# Patient Record
Sex: FEMALE | NOT HISPANIC OR LATINO | URBAN - METROPOLITAN AREA
[De-identification: names, ages, dates, MRNs, and addresses within clinical notes are randomized per-mention and may not be internally consistent; named-entity substitution may affect disease eponyms.]

---

## 2020-01-24 ENCOUNTER — HOSPITAL ENCOUNTER (OUTPATIENT)
Dept: OTHER | Facility: HOSPITAL | Age: 25
Discharge: HOME | End: 2020-01-24
Attending: NURSE PRACTITIONER

## 2020-01-24 ENCOUNTER — OFFICE VISIT CONVERTED (OUTPATIENT)
Dept: FAMILY MEDICINE CLINIC | Age: 25
End: 2020-01-24
Attending: NURSE PRACTITIONER

## 2020-01-24 LAB
AMYLASE SERPL-CCNC: 64 U/L (ref 30–110)
ERYTHROCYTE [DISTWIDTH] IN BLOOD BY AUTOMATED COUNT: 12.1 % (ref 11.5–14.5)
HBA1C MFR BLD: 13.6 G/DL (ref 12–16)
HCT VFR BLD AUTO: 41.2 % (ref 37–47)
LIPASE SERPL-CCNC: 17 U/L (ref 5–51)
MCH RBC QN AUTO: 29.6 PG (ref 27–31)
MCHC RBC AUTO-ENTMCNC: 33 G/DL (ref 33–37)
MCV RBC AUTO: 89.6 FL (ref 81–99)
PLATELET # BLD AUTO: 274 10*3/UL (ref 130–400)
PMV BLD AUTO: 9.3 FL (ref 7.4–10.4)
RBC # BLD AUTO: 4.6 10*6/UL (ref 4.2–5.4)
WBC # BLD AUTO: 11.37 10*3/UL (ref 4.8–10.8)

## 2020-01-28 ENCOUNTER — HOSPITAL ENCOUNTER (OUTPATIENT)
Dept: OTHER | Facility: HOSPITAL | Age: 25
Discharge: HOME | End: 2020-01-28
Attending: NURSE PRACTITIONER

## 2020-02-04 ENCOUNTER — HOSPITAL ENCOUNTER (OUTPATIENT)
Dept: NUCLEAR MEDICINE | Facility: HOSPITAL | Age: 25
Discharge: HOME | End: 2020-02-04
Attending: NURSE PRACTITIONER

## 2020-07-14 ENCOUNTER — OFFICE VISIT CONVERTED (OUTPATIENT)
Dept: FAMILY MEDICINE CLINIC | Age: 25
End: 2020-07-14
Attending: NURSE PRACTITIONER

## 2020-07-14 ENCOUNTER — HOSPITAL ENCOUNTER (OUTPATIENT)
Dept: OTHER | Facility: HOSPITAL | Age: 25
Discharge: HOME | End: 2020-07-14
Attending: NURSE PRACTITIONER

## 2021-05-18 NOTE — PROGRESS NOTES
Margaux Hammonds  1995     Office/Outpatient Visit    Visit Date: Tue, Jul 14, 2020 08:53 am    Provider: Flavia Gamble N.P. (Assistant: Bibi Montes MA)    Location: [facility]        Electronically signed by Flavia Gamble N.P. on  07/14/2020 09:51:55 AM                             Subjective:        CC: Mrs. Hammonds is a 24 year old White female.  She presents with low back pain, leg weakness, falls. Injury Friday night.          HPI: 24 year old female presenting to clinic with back starting 4 days ago. Pt was playing [employer] with  and went to [employer] to catch.  Pt has been using icy hot patches for discomfort. She states she fell 4 times (states that right leg would give out). No issues with bowel or bladder.     ROS:     CONSTITUTIONAL:  Negative for chills, fatigue and fever.      CARDIOVASCULAR:  Negative for chest pain and palpitations.      RESPIRATORY:  Negative for recent cough and dyspnea.      GASTROINTESTINAL:  Negative for abdominal pain.      MUSCULOSKELETAL:  Negative for myalgias.      NEUROLOGICAL:  Positive for paresthesia ( right lower extremity ).   Negative for dizziness or weakness.          Past Medical History / Family History / Social History:         Last Reviewed on 7/14/2020 09:21 AM by Flavia Gamble    Past Medical History:                 PAST MEDICAL HISTORY     UNREMARKABLE         CURRENT MEDICAL PROVIDERS:    Obstetrician/Gynecologist: sarthak machado         Surgical History:         Positive for    age 6 upper and lower scopes results unknown;;         Family History:         Positive for Hypertension.      Positive for Breast Cancer ( mat. aunt ), Colon Cancer ( mat. GF ), Lung Cancer ( mat. GF -- former smoker ) and Ovarian Cancer ( mat. aunt ).      Positive for Type 2 Diabetes ( pat. GF ).      Positive for Bipolar Disorder ( mother; pat. GM ).          Social History:         Marital Status:      Children: 2 children          Tobacco/Alcohol/Supplements:     Last Reviewed on 7/14/2020 09:21 AM by Flavia Gamble    Tobacco: She has a past history of cigarette smoking; quit date:  5/2020.  Non-drinker     Caffeine:  She admits to consuming caffeine via soda ( 2 servings per week ).          Substance Abuse History:     Last Reviewed on 7/14/2020 09:21 AM by Flavia Gamble        spice previously         Immunizations:     None        Allergies:     Last Reviewed on 7/14/2020 09:21 AM by Flavia Gamble    No Known Allergies.        Current Medications:     Last Reviewed on 7/14/2020 09:21 AM by Flavia Gamble    No Known Medications.        Objective:        Vitals:         Current: 7/14/2020 8:58:26 AM    Ht:  5 ft, 4.5 in;  Wt: 141 lbs;  BMI: 23.8T: 98 F (temporal);  BP: 117/62 mm Hg (left arm, sitting);  P: 93 bpm (left arm (BP Cuff), sitting)        Exams:     PHYSICAL EXAM:     GENERAL: vital signs recorded - well developed, well nourished;  well groomed;  no apparent distress;     EYES: extraocular movements intact; conjunctiva and cornea are normal; PERRLA;     NECK: range of motion is normal; thyroid exam reveals not enlarged;     RESPIRATORY: normal respiratory rate and pattern with no distress; normal breath sounds with no rales, rhonchi, wheezes or rubs;     CARDIOVASCULAR: normal rate; rhythm is regular;  no systolic murmur; no edema;     MUSCULOSKELETAL: normal gait; normal overall tone positive straight leg test     NEUROLOGIC: mental status: alert and oriented x 3;         Assessment:         M54.16   Radiculopathy, lumbar region           ORDERS:         Meds Prescribed:       [New Rx] predniSONE 20 mg oral tablet [take 2 tablets (40mg) by mouth daily x 5 days], #10 (ten) tablets, Refills: 0 (zero)       [New Rx] cyclobenzaprine 10 mg oral tablet [take 1 tablet (10 mg) by oral route every 8 hours as needed. ], #30 (thirty) tablets, Refills: 0 (zero)         Radiology/Test Orders:       [id number]  Radiologic  examination, spine, lumbosacral;  minimum of four views  (Send-Out)                      Plan:         Radiculopathy, lumbar regionWill order xray d/t multiple falls after pain began. Will notify pt of results. She declined steroid injection. Oral steroid and muscle relaxer sent to pharmacy. Pt not to take NSAIDS while taking steroid. Tylenol and Icy hot patches as needed. Pt to notify clinic with any acute concerns. Pt v/u and had no further questions upon d/c.         RADIOLOGY:  I have ordered Lumbar/Sacral Spine X-ray to be done today.            Prescriptions:       [New Rx] predniSONE 20 mg oral tablet [take 2 tablets (40mg) by mouth daily x 5 days], #10 (ten) tablets, Refills: 0 (zero)       [New Rx] cyclobenzaprine 10 mg oral tablet [take 1 tablet (10 mg) by oral route every 8 hours as needed. ], #30 (thirty) tablets, Refills: 0 (zero)           Orders:       [id number]  Radiologic examination, spine, lumbosacral;  minimum of four views  (Send-Out)                  Charge Capture:         Primary Diagnosis:     M54.16  Radiculopathy, lumbar region           Orders:      [id number]  Office/outpatient visit; established patient, level 3  (In-House)

## 2021-05-18 NOTE — PROGRESS NOTES
Margaux Hammonds  1995     Office/Outpatient Visit    Visit Date: Fri, Jan 24, 2020 01:34 pm    Provider: Kamilah Gracia N.P. (Assistant: Laurel Markham MA)    Location: [facility]        Electronically signed by Kamilah Gracia N.P. on  01/26/2020 09:15:16 PM                             Subjective:        CC: Mrs. Hammonds is a 24 year old White female.  This is her first visit to the clinic.  went to er a couple weeks ago for a fecal impaction, still in pain;         HPI: seen with ana lilia arias student      lmp jan 12    PHQ-9 Depression Screening: Completed form scanned and in chart; Total Score 4           In regard to the generalized abdominal pain, to [facility] 10 with RLQ pain that radiated to LLQ.  felt chills.  wbc count was high so ct abd and pelvis done.  noted constipation.  pt states she has bm every single day but does strain more often than she used to .  afebrile.  nausea and bloating but no vomiting or diarrhea or blood in stool.  has always been sensitive to spicy food .  takes tums prn.  has taken miralax x 3 days and dicyclomine at bedtime with little relief.  hx of upper and lower scopes at age 6 results unknown.  is concerned about her gallbladder -  all of her mothers family has had gallbladder removal- and diffuse and RLQ complaints persist.            With regard to the acute vaginitis, the presenting complaint is vaginal discharge without irritation.  These have been present for the past 2 weeks.  Associated symptoms include abdominal pain.  She denies associated vaginal odor, vaginal itching, dyspareunia or fever.  This has not been previously treated.        19.1 at [facility] ER 1-    ROS:     CONSTITUTIONAL:  Positive for chills.   Negative for fever.      CARDIOVASCULAR:  Negative for chest pain, palpitations, tachycardia, orthopnea, and edema.      GASTROINTESTINAL:  Positive for abdominal pain ( RLQ; suprapubic ), acid reflux symptoms, abdominal  bloating, constipation and nausea ( every other day ).   Negative for diarrhea, hematochezia or vomiting.      GENITOURINARY:  Positive for vaginal discharge.   Negative for dysuria or vaginal itching.      MUSCULOSKELETAL:  Negative for arthralgias, back pain, and myalgias.      PSYCHIATRIC:  Negative for anxiety, depression, and sleep disturbances.          Past Medical History / Family History / Social History:         Last Reviewed on 1/24/2020 02:04 PM by Kamilah Gracia    Past Medical History:                 PAST MEDICAL HISTORY     UNREMARKABLE         CURRENT MEDICAL PROVIDERS:    Obstetrician/Gynecologist: sarthak machado         Surgical History:         Positive for    age 6 upper and lower scopes results unknown;;         Family History:         Positive for Hypertension.      Positive for Breast Cancer ( mat. aunt ), Colon Cancer ( mat. GF ), Lung Cancer ( mat. GF -- former smoker ) and Ovarian Cancer ( mat. aunt ).      Positive for Type 2 Diabetes ( pat. GF ).      Positive for Bipolar Disorder ( mother; pat. GM ).          Social History:         Marital Status:      Children: 2 children         Tobacco/Alcohol/Supplements:     Last Reviewed on 1/24/2020 01:39 PM by Laurel Markham    Tobacco: Current Smoker: She currently smokes some days, 1-5 cigarettes per day.  Non-drinker     Caffeine:  She admits to consuming caffeine via soda ( 2 servings per week ).          Substance Abuse History:         spice previously         Current Problems:     None Recorded        Immunizations:     None        Allergies:     Last Reviewed on 12/31/2011 12:10 PM by Lilliam Valentin    No Known Allergies.        Current Medications:     Last Reviewed on 1/24/2020 01:39 PM by Laurel Markham    None        Objective:        Vitals:         Current: 1/24/2020 1:40:30 PM    Ht:  5 ft, 4.5 in;  Wt: 138.8 lbs;  BMI: 23.5T: 98.4 F (oral);  BP: 112/77 mm Hg (left arm, sitting);  P: 91 bpm (left arm (BP Cuff), sitting)         Exams:     PHYSICAL EXAM:     GENERAL:  well developed and nourished; appropriately groomed; in no apparent distress;     RESPIRATORY:  lungs clear to auscultation and percussion; symmetric expansion; no dyspnea;     CARDIOVASCULAR: normal rate; rhythm is regular;  no edema;     GASTROINTESTINAL: mild RUQ, RLQ, and suprapubic tenderness;  hypoactive bowel sounds;     NEUROLOGIC: GROSSLY INTACT     PSYCHIATRIC: appropriate affect and demeanor; normal speech pattern;         Assessment:         Z13.31   Encounter for screening for depression       R10.84   Generalized abdominal pain       N76.0   Acute vaginitis       D72.829   Elevated white blood cell count, unspecified       R10.811   Right upper quadrant abdominal tenderness           ORDERS:         Radiology/Test Orders:       [id number]  Ultrasound Right Upper Quadrant abdomen limited  (Send-Out)              Lab Orders:       [id number]  BDCB2 - [facility] CBC w/o diff  (Send-Out)            [id number]  VAGSC - [facility] VAG SCREEN CANDIDA,BRAN, & TRICH [id number]  (Send-Out)            93222  AMYS - [facility] Amylase, Serum  (Send-Out)            33950  LIP - [facility] Lipase, Serum  (Send-Out)              Other Orders:         Depression screen negative  (In-House)                      Plan:         Encounter for screening for depression    MIPS PHQ-9 Depression Screening: Completed form scanned and in chart; Total Score 4; Negative Depression Screen           Orders:         Depression screen negative  (In-House)              Generalized abdominal pain    LABORATORY:  Labs ordered to be performed today include amylase and Lipase.      RECOMMENDATIONS given include: discontinue use of NSAIDs and aspirin, avoid spicy foods, and to ER if worsens..            Orders:       [id number]  AMYS - [facility] Amylase, Serum  (Send-Out)            35067  LIP - [facility] Lipase, Serum  (Send-Out)                Patient Education Handouts:       Irritable Bowel Syndrome (IBS)          Acute vaginitis    RECOMMENDATIONS  given include: avoid sugar, no douching, no bubble baths, and no scented toilet paper or sanitary pads.            Orders:       [id number]  VAGSC - [facility] VAG SCREEN CANDIDA,BRAN, & TRICH 11138  (Send-Out)              Elevated white blood cell count, unspecified    LABORATORY:  Labs ordered to be performed today include CBC W/O DIFF.            Orders:       [id number]  BDCB2 - [facility] CBC w/o diff  (Send-Out)              Right upper quadrant abdominal tenderness        RADIOLOGY:  I have ordered Abdominal Ultrasound Limited Right Upper Quadrant to be done today.            Orders:       [id number]  Ultrasound Right Upper Quadrant abdomen limited  (Send-Out)                  Patient Recommendations:        For  Generalized abdominal pain:        Stop taking aspirin and anti-inflammatory medications such as ibuprofen and naproxen.     Avoid spicy foods in your diet.          For  Acute vaginitis:    Avoid sugar. Douch no more than once monthly. Do not take bubble baths. Avoid scented toilet paper, sanitary pads or deodorant sprays.              Charge Capture:         Primary Diagnosis:     Z13.31  Encounter for screening for depression           Orders:      [id number]  Office/outpatient visit; established patient, level 4  (In-House)              Depression screen negative  (In-House)              R10.84  Generalized abdominal pain     N76.0  Acute vaginitis     D72.829  Elevated white blood cell count, unspecified     R10.811  Right upper quadrant abdominal tenderness         ADDENDUMS:      ____________________________________    Addendum: 02/02/2020 09:01 PM - Kamilah Gracia        add [id number]    remove [id number]. willie

## 2021-07-02 VITALS
WEIGHT: 141 LBS | SYSTOLIC BLOOD PRESSURE: 117 MMHG | HEIGHT: 65 IN | HEART RATE: 93 BPM | DIASTOLIC BLOOD PRESSURE: 62 MMHG | TEMPERATURE: 98 F | BODY MASS INDEX: 23.49 KG/M2

## 2021-07-02 VITALS
HEIGHT: 65 IN | SYSTOLIC BLOOD PRESSURE: 112 MMHG | DIASTOLIC BLOOD PRESSURE: 77 MMHG | WEIGHT: 138.8 LBS | TEMPERATURE: 98.4 F | BODY MASS INDEX: 23.13 KG/M2 | HEART RATE: 91 BPM